# Patient Record
Sex: FEMALE | Race: WHITE | NOT HISPANIC OR LATINO | URBAN - METROPOLITAN AREA
[De-identification: names, ages, dates, MRNs, and addresses within clinical notes are randomized per-mention and may not be internally consistent; named-entity substitution may affect disease eponyms.]

---

## 2017-07-10 ENCOUNTER — IMPORTED ENCOUNTER (OUTPATIENT)
Dept: URBAN - METROPOLITAN AREA CLINIC 50 | Facility: CLINIC | Age: 72
End: 2017-07-10

## 2017-07-25 ENCOUNTER — IMPORTED ENCOUNTER (OUTPATIENT)
Dept: URBAN - METROPOLITAN AREA CLINIC 50 | Facility: CLINIC | Age: 72
End: 2017-07-25

## 2017-08-15 ENCOUNTER — IMPORTED ENCOUNTER (OUTPATIENT)
Dept: URBAN - METROPOLITAN AREA CLINIC 50 | Facility: CLINIC | Age: 72
End: 2017-08-15

## 2017-08-15 NOTE — PATIENT DISCUSSION
"""Recommend cataract extraction with intraocular lens (IOL) OD then OS. Discussed the RBAs mentioned the loss part/all of vision with surgery answered patient questions and offered patient copy of consent form. """

## 2017-08-15 NOTE — PATIENT DISCUSSION
"""[place]aco with IOL OD: 08/22/2017 Reji [id number] +22.0 [place] [facility] [place]

## 2017-08-22 ENCOUNTER — IMPORTED ENCOUNTER (OUTPATIENT)
Dept: URBAN - METROPOLITAN AREA CLINIC 50 | Facility: CLINIC | Age: 72
End: 2017-08-22

## 2017-08-29 ENCOUNTER — IMPORTED ENCOUNTER (OUTPATIENT)
Dept: URBAN - METROPOLITAN AREA CLINIC 50 | Facility: CLINIC | Age: 72
End: 2017-08-29

## 2017-09-05 ENCOUNTER — IMPORTED ENCOUNTER (OUTPATIENT)
Dept: URBAN - METROPOLITAN AREA CLINIC 50 | Facility: CLINIC | Age: 72
End: 2017-09-05

## 2017-09-18 ENCOUNTER — IMPORTED ENCOUNTER (OUTPATIENT)
Dept: URBAN - METROPOLITAN AREA CLINIC 50 | Facility: CLINIC | Age: 72
End: 2017-09-18

## 2017-10-10 ENCOUNTER — IMPORTED ENCOUNTER (OUTPATIENT)
Dept: URBAN - METROPOLITAN AREA CLINIC 50 | Facility: CLINIC | Age: 72
End: 2017-10-10

## 2018-03-13 ENCOUNTER — IMPORTED ENCOUNTER (OUTPATIENT)
Dept: URBAN - METROPOLITAN AREA CLINIC 50 | Facility: CLINIC | Age: 73
End: 2018-03-13

## 2019-01-07 NOTE — PROCEDURE NOTE: SURGICAL
"<span style=""font-weight:bold;"">MR #:</span> [id number]<br /><br /><span style=""font-weight:bold;"">PREOPERATIVE DIAGNOSIS:</span> Cataract

## 2019-01-14 NOTE — PROCEDURE NOTE: SURGICAL
"<span style=""font-weight:bold;"">MR #:</span> [id number]<br /><br /><span style=""font-weight:bold;"">PREOPERATIVE DIAGNOSIS:</span> Cataract

## 2019-04-25 ENCOUNTER — IMPORTED ENCOUNTER (OUTPATIENT)
Dept: URBAN - METROPOLITAN AREA CLINIC 50 | Facility: CLINIC | Age: 74
End: 2019-04-25

## 2019-05-09 ENCOUNTER — IMPORTED ENCOUNTER (OUTPATIENT)
Dept: URBAN - METROPOLITAN AREA CLINIC 50 | Facility: CLINIC | Age: 74
End: 2019-05-09

## 2019-08-23 ENCOUNTER — IMPORTED ENCOUNTER (OUTPATIENT)
Dept: URBAN - METROPOLITAN AREA CLINIC 50 | Facility: CLINIC | Age: 74
End: 2019-08-23

## 2019-08-26 ENCOUNTER — IMPORTED ENCOUNTER (OUTPATIENT)
Dept: URBAN - METROPOLITAN AREA CLINIC 50 | Facility: CLINIC | Age: 74
End: 2019-08-26

## 2021-04-17 ASSESSMENT — VISUAL ACUITY
OD_OTHER: 20/30. 20/40.
OD_SC: 20/25
OD_SC: 20/25
OS_CC: J1+
OS_OTHER: 20/30. 20/40.
OS_SC: 20/20
OS_SC: 20/50
OS_SC: 20/25
OD_CC: 20/40+
OD_BAT: 20/40
OS_CC: J1+
OD_OTHER: 20/40. 20/60.
OD_SC: 20/80
OD_BAT: 20/30
OS_OTHER: 20/30. 20/40.
OD_CC: J1@ 16 IN
OD_SC: 20/25
OS_SC: 20/20
OD_SC: 20/25-2
OS_BAT: 20/40
OD_SC: 20/60
OS_CC: 20/40
OS_BAT: 20/30
OS_BAT: 20/40
OD_BAT: 20/40
OD_BAT: 20/30
OD_OTHER: 20/30. 20/40.
OS_SC: 20/20
OS_CC: J1@ 16 IN
OD_CC: J2
OD_SC: 20/25-1
OS_OTHER: 20/40. 20/40.
OS_SC: 20/20
OD_OTHER: 20/40. 20/60.
OS_OTHER: 20/40. 20/60.
OS_CC: J2
OD_SC: 20/30-
OS_BAT: 20/40
OS_SC: 20/50-
OD_SC: 20/30
OS_BAT: 20/30
OD_CC: J1+
OS_OTHER: 20/40. 20/40.
OS_CC: 20/30-
OS_SC: 20/25
OD_CC: J1+

## 2021-04-17 ASSESSMENT — TONOMETRY
OS_IOP_MMHG: 17
OD_IOP_MMHG: 16
OS_IOP_MMHG: 18
OS_IOP_MMHG: 15
OS_IOP_MMHG: 18
OD_IOP_MMHG: 14
OD_IOP_MMHG: 24
OS_IOP_MMHG: 16
OS_IOP_MMHG: 24
OS_IOP_MMHG: 16
OD_IOP_MMHG: 12
OS_IOP_MMHG: 16
OD_IOP_MMHG: 15
OD_IOP_MMHG: 18
OD_IOP_MMHG: 15
OS_IOP_MMHG: 15
OD_IOP_MMHG: 16
OD_IOP_MMHG: 16
OD_IOP_MMHG: 14
OS_IOP_MMHG: 14

## 2021-09-23 ENCOUNTER — PREPPED CHART (OUTPATIENT)
Dept: URBAN - METROPOLITAN AREA CLINIC 49 | Facility: CLINIC | Age: 76
End: 2021-09-23

## 2021-09-27 ENCOUNTER — ANNUAL COMPREHENSIVE EXAM (OUTPATIENT)
Dept: URBAN - METROPOLITAN AREA CLINIC 49 | Facility: CLINIC | Age: 76
End: 2021-09-27

## 2021-09-27 DIAGNOSIS — H35.373: ICD-10-CM

## 2021-09-27 DIAGNOSIS — H16.223: ICD-10-CM

## 2021-09-27 DIAGNOSIS — H35.363: ICD-10-CM

## 2021-09-27 DIAGNOSIS — H43.813: ICD-10-CM

## 2021-09-27 PROCEDURE — 92134 CPTRZ OPH DX IMG PST SGM RTA: CPT

## 2021-09-27 PROCEDURE — 92014 COMPRE OPH EXAM EST PT 1/>: CPT

## 2021-09-27 ASSESSMENT — VISUAL ACUITY
OU_SC: J2-
OD_SC: 20/25-1
OS_SC: 20/25

## 2021-09-27 ASSESSMENT — TONOMETRY
OS_IOP_MMHG: 16
OD_IOP_MMHG: 15

## 2022-09-26 ENCOUNTER — COMPREHENSIVE EXAM (OUTPATIENT)
Dept: URBAN - METROPOLITAN AREA CLINIC 49 | Facility: CLINIC | Age: 77
End: 2022-09-26

## 2022-09-26 DIAGNOSIS — H35.373: ICD-10-CM

## 2022-09-26 DIAGNOSIS — H16.223: ICD-10-CM

## 2022-09-26 DIAGNOSIS — H43.813: ICD-10-CM

## 2022-09-26 PROCEDURE — 92134 CPTRZ OPH DX IMG PST SGM RTA: CPT

## 2022-09-26 PROCEDURE — 92014 COMPRE OPH EXAM EST PT 1/>: CPT

## 2022-09-26 ASSESSMENT — TONOMETRY
OD_IOP_MMHG: 16
OS_IOP_MMHG: 16

## 2022-09-26 ASSESSMENT — VISUAL ACUITY
OD_SC: 20/20-2
OS_SC: 20/20
OU_CC: J1+

## 2023-05-12 NOTE — PATIENT DISCUSSION
The patient feels that the cataract is significantly impacting daily activities and has elected cataract surgery. The risks, benefits, and alternatives to surgery were discussed. The patient elects to proceed with surgery. chest pain